# Patient Record
Sex: MALE | URBAN - METROPOLITAN AREA
[De-identification: names, ages, dates, MRNs, and addresses within clinical notes are randomized per-mention and may not be internally consistent; named-entity substitution may affect disease eponyms.]

---

## 2024-05-12 ENCOUNTER — HOSPITAL ENCOUNTER (OUTPATIENT)
Dept: TELEMEDICINE | Facility: HOSPITAL | Age: 84
Discharge: HOME | End: 2024-05-12

## 2024-05-12 PROCEDURE — G0426 INPT/ED TELECONSULT50: HCPCS | Mod: GT,,, | Performed by: PSYCHIATRY & NEUROLOGY

## 2024-05-12 NOTE — TELEMEDICINE CONSULT
[facility]  Vascular Neurology  Comprehensive Stroke Center  TeleVascular Neurology Acute Consultation Note        Consult Information  Consults    Consulting Provider: AIXA OWENS   Current Providers  No providers found    Patient Location: [facility] TELEMEDICINE ED [facility] Emergency Department    Spoke hospital nurse at bedside with patient assisting consultant.  Patient information was obtained from patient.       Stroke Documentation  Acute Stroke Times   Last Known Normal Date: 05/11/24  Last Known Normal Time: 2230  Stroke Team Called Date: 05/12/24  Stroke Team Called Time: 1426  Stroke Team Arrival Date: 05/12/24  Stroke Team Arrival Time: 1426  Thrombolytic Therapy Recommended: No    NIH Scale:         Modified Frankie:    [place] Coma Scale:     ABCD2 Score:    [id number] Score:    HAS -BLED Score:    ICH Score:    Hunt & Briggs Classification:      There were no vitals taken for this visit.    Van Negative    Medical Decision Making  HPI:  83 y.o. male Referring Facility and Unit: [facility] ED Last Known Well Date: Yesterday Last Known Well Time: 2230 Symptoms: left upper etremity weakness and numbness, left facial numbness Unilateral Weakness (yes or no): NO If Unilateral Weakness is yes, is there Aphasia, Confusion, Neglect, Visual Deficit: NO Van Negative   CT  Referring Physician: Dr Owens     Images personally reviewed and interpreted:  Study: Head CT  Study Interpretation:  per ER- Brain hemorrhage     Assessment and plan:    - Recommend BP  goal < 160 mm Hg  - Recommend transfer for neuro surgery    Lytics recommendation: Thrombolytic therapy not recommended due to Hemorrhage on CT   Thrombectomy recommendation: Awaiting CTA results from Spoke for determination   Placement recommendation: transfer to nearest appropriate facility                ROS  Physical Exam  No past medical history on file.  No past surgical history on  file.  No family history on file.    Diagnoses  Stroke like symptoms    Cj Luis MD      Emergent/Acute neurological consultation requested by spoke provider due to critical concerns for possible cerebrovascular event that could result in permanent loss of neurologic/bodily function, severe disability or death of this patient.  Immediate/timely evaluation by a highly prepared expert is paramount for optimal outcomes  High risk for neurological deterioration if not properly diagnosed  High risk for neurological deterioration if not treated promplty/as soon as possible  Complex diagnostic evaluation may be required (advanced imaging)  High risk treatment options (thrombolytics and/or thrombectomy)    Patient care was coordinated with spoke provider, including but not limted to    Discussing likely diagnosis/etiology of symptoms  Making recommendations for further diagnostic studies  Making recommendations for intravenous thrombolytics or other advanced therapies  Making recommendations for disposition (admission/transfer for higher level of care)

## 2024-05-12 NOTE — SUBJECTIVE & OBJECTIVE
HPI:  83 y.o. male Referring Facility and Unit: [facility] ED Last Known Well Date: Yesterday Last Known Well Time: 2230 Symptoms: left upper etremity weakness and numbness, left facial numbness Unilateral Weakness (yes or no): NO If Unilateral Weakness is yes, is there Aphasia, Confusion, Neglect, Visual Deficit: NO Van Negative   CT  Referring Physician: Dr Owens     Images personally reviewed and interpreted:  Study: Head CT  Study Interpretation:  per ER- Brain hemorrhage     Assessment and plan:    - Recommend BP  goal < 160 mm Hg  - Recommend transfer for neuro surgery    Lytics recommendation: Thrombolytic therapy not recommended due to Hemorrhage on CT   Thrombectomy recommendation: Awaiting CTA results from Spoke for determination   Placement recommendation: transfer to nearest appropriate facility